# Patient Record
Sex: MALE | Employment: FULL TIME | URBAN - METROPOLITAN AREA
[De-identification: names, ages, dates, MRNs, and addresses within clinical notes are randomized per-mention and may not be internally consistent; named-entity substitution may affect disease eponyms.]

---

## 2022-10-18 ENCOUNTER — OFFICE VISIT (OUTPATIENT)
Dept: FAMILY MEDICINE | Facility: CLINIC | Age: 42
End: 2022-10-18
Payer: OTHER MISCELLANEOUS

## 2022-10-18 VITALS
RESPIRATION RATE: 16 BRPM | OXYGEN SATURATION: 98 % | HEART RATE: 84 BPM | SYSTOLIC BLOOD PRESSURE: 130 MMHG | WEIGHT: 173 LBS | BODY MASS INDEX: 29.53 KG/M2 | TEMPERATURE: 97.3 F | DIASTOLIC BLOOD PRESSURE: 87 MMHG | HEIGHT: 64 IN

## 2022-10-18 DIAGNOSIS — M25.531 RIGHT WRIST PAIN: Primary | ICD-10-CM

## 2022-10-18 PROCEDURE — 99203 OFFICE O/P NEW LOW 30 MIN: CPT | Performed by: PHYSICIAN ASSISTANT

## 2022-10-18 NOTE — PROGRESS NOTES
"  Assessment & Plan       ICD-10-CM    1. Right wrist pain  M25.531       Talk to patient about his concerns regarding his wrist.  He appears to have made full recovery.  We talked about activity modifications and progressive return back to work.  He has a wrist brace that he can use as needed otherwise return in 4 weeks as needed.  Note for work given       Return in about 4 weeks (around 11/15/2022) for recheck, As Needed.    Pasha Hitchcock PA-C  [facility]   Jose is a 42 year old accompanied by his self, presenting for the following health issues:  Musculoskeletal Problem      History of Present Illness       Reason for visit:  Whirst injure  Symptom onset:  1-3 days ago  Symptom intensity:  Mild  Symptom progression:  Improving  Had these symptoms before:  No    He eats 2-3 servings of fruits and vegetables daily.He consumes 3 sweetened beverage(s) daily.He exercises with enough effort to increase his heart rate 60 or more minutes per day.  He exercises with enough effort to increase his heart rate 6 days per week.   He is taking medications regularly.   was seen in . on 10/11 and 10/17. Care Everywhere Reviewed  Fell at work on 10/4/2022. Neg x-ray.   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  He had pain around his wrist.  He was seen in urgent care and given wrist brace .  Over time now he has felt that it is getting better.  He denies any numbness or tingling.  He is requesting a note to return back to work without any restrictions.  He has currently been working with light duty.    Review of Systems   Constitutional, HEENT, cardiovascular, pulmonary, gi and gu systems are negative, except as otherwise noted.      Objective    /87   Pulse 84   Temp 97.3  F (36.3  C) (Tympanic)   Resp 16   Ht 1.626 m (5' 4\")   Wt 78.5 kg (173 lb)   SpO2 98%   BMI 29.70 kg/m    Body mass index " is 29.7 kg/m .  Physical Exam   GENERAL: healthy, alert and no distress  Wrist Exam: WRIST:  Inspection: no swelling  Palpation: no pain no tenderness throughout his wrist or snuffbox.  He has full range of motion.  He has 5 5  strength.  He has 5 5 strength with flexion extension supination and pronation.  Neuro vas intact distally.

## 2022-10-18 NOTE — LETTER
[facility] [place]  [address] [facility] [id number]  Phone: [phone]    October 18, 2022        Jose Rankin  [address] [facility] [address]          To whom it may concern:    RE: Jose Rankin    Patient was seen and treated today at our clinic.Jose Rankin is able to return back to work with no restriction on 10/19/2022.     Please contact me for questions or concerns.      Sincerely,        Pasha Hitchcock PA-C